# Patient Record
(demographics unavailable — no encounter records)

---

## 2024-11-13 NOTE — REASON FOR VISIT
[Initial Visit] : an initial visit for [Other: _____] : [unfilled] [FreeTextEntry2] : Left humerus pain, right knee pain. DOI: 10/17/24

## 2024-11-13 NOTE — PHYSICAL EXAM
[de-identified] : General Exam: Appearance: well developed and nourished Orientation: Alert and oriented to person, place, time. Mood: mood and affect well-adjusted, pleasant and cooperative, appropriate for clinical and encounter circumstances  Left Upper Arm: Skin: skin intact, no rashes or lesions. Inspection: +TTP to left proximal humerus; normal alignment, no deformity, no warmth, no masses  Strength: intact LUE Sensation: intact LUE  Right Knee: Skin: intact, no rashes or lesions. Inspection: +TTP to medial patella and medial joint line; normal alignment, no deformity, no warmth, no masses.   Quadriceps: Strength: 4+/5, normal muscle tone.   [de-identified] : stiff and limited to knee from about 0-60 degrees stiff and limited left shoulder ROM [de-identified] : 2 views of the left humerus obtained today show a healing left proximal humerus fracture 2 views of the right knee obtained today show OA changes   ACC: [id number] EXAM: MR KNEE RT ORDERED BY: HARVINDER PAREDES  PROCEDURE DATE: 10/22/2024    INTERPRETATION: History: Trauma  Technique: Multiplanar and multisequence MRI images were obtained through the right knee without contrast.  Comparison: CT dated 10/17/2024  Findings:  Evaluation is limited due to motion artifact.  LIGAMENTS: Posterior cruciate ligament is intact. Mucoid degeneration of anterior cruciate ligament. Medial and lateral collateral ligaments are intact.  MEDIAL COMPARTMENT: Evaluation for a meniscal tear is limited due to motion artifact. Complex tearing of the posterior horn of medial meniscus. Superior surface fraying of the anterior horn and anterior horn/body junction of the medial meniscus. Areas of intrasubstance degeneration within the body segment of the medial meniscus. Broad-based moderate partial thickness cartilage wear along the medial femoral condyle with areas of subchondral cystic change and edema. Early osteophyte formation. Broad-based mild cartilage thinning along the medial tibial plateau.  LATERAL COMPARTMENT: Evaluation for meniscal tear is limited due to motion artifact. No definite meniscal tear seen. Articular cartilage is intact.  PATELLOFEMORAL COMPARTMENT: Evaluation for fracture line is limited due to lack of T1 weighted images. Acute nondisplaced intra-articular fracture of the patella. Fracture lines are better seen on recent CT. Articular cartilage is otherwise grossly intact. Extensor mechanism is intact.  JOINT: Moderate knee joint effusion. Moderate complex Baker's cyst with findings of rupture/leakage.  SOFT TISSUES: Subcutaneous edema about the knee, most prominent anteriorly.  IMPRESSION:  Evaluation is limited due to motion artifact, as above.  Evaluation for fracture is limited due to lack of T1 weighted sequences. Acute nondisplaced intra-articular fracture of the patella. Fracture lines are better seen on recent CT.  Mucoid degeneration of anterior cruciate ligament.  Posterior cruciate ligament is intact.  Mild to moderate cartilage wear within the medial compartment.  Medial meniscal tear.  Moderate knee joint effusion. Moderate sized complex Baker's cyst with findings of rupture/leakage.  --- End of Report ---

## 2024-11-13 NOTE — DISCUSSION/SUMMARY
[de-identified] : 77yo female with left shoulder and right knee injuries. She may discontinue use of knee immobilizer. PT orders to work on ROM at both upper and lower extremities. She may WBAT at upper and lower extremities. I have prescribed her a diclofenac gel to use PRN at shoulder as she does not like to take pills but expresses pain. Follow up recommended in 6-8 weeks.  The patient was given the opportunity to ask questions and all questions were answered to their satisfaction.  Orthopaedic Trauma Surgeon Addendum:  I have personally performed a face-to-face diagnostic evaluation on this patient.  I have reviewed the physician assistant note and agree with the history, exam, and plan of care, except as noted.  Jone Kang MD Orthopaedic Trauma Surgeon [facility] [place]

## 2024-11-13 NOTE — HISTORY OF PRESENT ILLNESS
[de-identified] : Patient follows up s/p left proximal humerus and right knee injury. DOI: 10/17/24. She presents with her grandson today. Patient states she is feeling better at her knee, but her shoulder continues to give her pain. Pain is worsened with movement and radiates in to her neck. She is working with PT in home. Patient occasionally takes tylenol for pain relief.

## 2024-12-05 NOTE — REASON FOR VISIT
[Follow-up] : a follow-up of an existing diagnosis [Family Member] : family member [FreeTextEntry1] : GERD with esophagitis

## 2024-12-05 NOTE — PHYSICAL EXAM
[Alert] : alert [Normal Voice/Communication] : normal voice/communication [Healthy Appearing] : healthy appearing [No Acute Distress] : no acute distress [Underweight (BMI <= 18.5)] : underweight (BMI <= 18.5) [Sclera] : the sclera and conjunctiva were normal [Hearing Threshold Finger Rub Not [place]] : hearing was normal [Normal Lips/Gums] : the lips and gums were normal [Normal Appearance] : the appearance of the neck was normal [No Respiratory Distress] : no respiratory distress [No Acc Muscle Use] : no accessory muscle use [Respiration, Rhythm And Depth] : normal respiratory rhythm and effort [Auscultation Breath Sounds / Voice Sounds] : lungs were clear to auscultation bilaterally [Heart Rate And Rhythm] : heart rate was normal and rhythm regular [Normal S1, S2] : normal S1 and S2 [Bowel Sounds] : normal bowel sounds [Abdomen Tenderness] : non-tender [No Masses] : no abdominal mass palpated [Abdomen Soft] : soft [] : no hepatosplenomegaly [Oriented To Time, Place, And Person] : oriented to person, place, and time

## 2024-12-30 NOTE — PHYSICAL EXAM
[de-identified] : General Exam: Appearance: well developed and nourished Orientation: Alert and oriented to person, place, time. Mood: mood and affect well-adjusted, pleasant and cooperative, appropriate for clinical and encounter circumstances  Left Upper Arm: Skin: skin intact, no rashes or lesions. Inspection: +TTP to left proximal humerus; normal alignment, no deformity, no warmth, no masses Strength: intact LUE Sensation: intact LUE  Right Knee: Skin: intact, no rashes or lesions. Inspection: +TTP to medial patella and medial joint line; normal alignment, no deformity, no warmth, no masses. Quadriceps: Strength: 4+/5, normal muscle tone.   [de-identified] : stiff and limited to knee from about 0-90 degrees stiff and limited left shoulder ROM.   [de-identified] : 2 views of the right knee show healed patella fracture 2 views of the left shoulder show healed proximal humerus fracture

## 2024-12-30 NOTE — DISCUSSION/SUMMARY
[de-identified] : 79yo female with left shoulder and right knee injuries. I discussed with patient and son that her fractures appear well healed on imaging today. I again recommended and stressed importance of PT to improve patient's strength and mobility. Orders for in home PT and outpatient PT placed today. I have renewed her RX of diclofenac gel as she continues to express pain, and grandson believes she may not have gotten this prescription filled after last visit. She may follow up PRN.  The patient was given the opportunity to ask questions, and all questions were answered to their satisfaction.

## 2024-12-30 NOTE — REASON FOR VISIT
[Follow-Up Visit] : a follow-up visit for [FreeTextEntry2] : Left humerus pain, right knee pain. DOI: 10/17/24

## 2024-12-30 NOTE — HISTORY OF PRESENT ILLNESS
[de-identified] : Patient follows up s/p left proximal humerus and right knee injury. DOI: 10/17/24. She presents with her grandson today. Patient and grandson state since last visit, patient was unable to do PT due to holiday plans. She continues to have pain at her shoulder and knee. She is taking tylenol for pain. Pain worsens with any movement.

## 2025-01-28 NOTE — HISTORY OF PRESENT ILLNESS
[FreeTextEntry1] : Patient has arrived for colonoscopy for iron deficiency anemia anemia. EGD in 12/24 showed Grade D esophagitis. Remote colonoscopy

## 2025-01-28 NOTE — PHYSICAL EXAM
[Alert] : alert [Normal Voice/Communication] : normal voice/communication [Healthy Appearing] : healthy appearing [No Acute Distress] : no acute distress [Sclera] : the sclera and conjunctiva were normal [Hearing Threshold Finger Rub Not [place]] : hearing was normal [Normal Lips/Gums] : the lips and gums were normal [Oropharynx] : the oropharynx was normal [Normal Appearance] : the appearance of the neck was normal [No Neck Mass] : no neck mass was observed [No Respiratory Distress] : no respiratory distress [No Acc Muscle Use] : no accessory muscle use [Respiration, Rhythm And Depth] : normal respiratory rhythm and effort [Auscultation Breath Sounds / Voice Sounds] : lungs were clear to auscultation bilaterally [Heart Rate And Rhythm] : heart rate was normal and rhythm regular [Normal S1, S2] : normal S1 and S2 [Murmurs] : no murmurs [Bowel Sounds] : normal bowel sounds [Abdomen Tenderness] : non-tender [No Masses] : no abdominal mass palpated [Abdomen Soft] : soft [] : no hepatosplenomegaly [Oriented To Time, Place, And Person] : oriented to person, place, and time